# Patient Record
(demographics unavailable — no encounter records)

---

## 2024-11-13 NOTE — PHYSICAL EXAM
[General Appearance - Alert] : alert [General Appearance - In No Acute Distress] : in no acute distress [General Appearance - Well-Appearing] : well appearing [Attitude Uncooperative] : cooperative [General Appearance - Well Developed] : playful [Facies] : there were no dysmorphic facial features [Examination Of The Oral Cavity] : mucous membranes were moist and pink [Sclera] : the conjunctiva were normal [No Cough] : no cough [Auscultation Breath Sounds / Voice Sounds] : breath sounds clear to auscultation bilaterally [Stridor] : no stridor was observed [Respiration, Rhythm And Depth] : normal respiratory rhythm and effort [Normal Chest Appearance] : the chest was normal in appearance [Chest Visual Inspection Thoracic Deformity] : no chest wall deformity [Apical Impulse] : quiet precordium with normal apical impulse [Heart Rate And Rhythm] : normal heart rate and rhythm [Heart Sounds] : normal S1 and S2 [No Murmur] : no murmurs  [Heart Sounds Gallop] : no gallops [Heart Sounds Pericardial Friction Rub] : no pericardial rub [Edema] : no edema [Arterial Pulses] : normal upper and lower extremity pulses with no pulse delay [Heart Sounds Click] : no clicks [Capillary Refill Test] : normal capillary refill [Abdomen Soft] : soft [] : no hepato-splenomegaly [Nail Clubbing] : no clubbing  or cyanosis of the fingernails [Abnormal Walk] : normal gait [Skin Lesions] : no lesions [Demonstrated Behavior - Infant Nonreactive To Parents] : interactive [FreeTextEntry9] : Brace over left wrist and hand

## 2024-11-13 NOTE — CARDIOLOGY SUMMARY
[Today's Date] : [unfilled] [FreeTextEntry1] : An electrocardiogram performed on [place] on account of her symptoms reveals a normal sinus rhythm with a normal axis.  There is no evidence for chamber enlargement or hypertrophy.

## 2024-11-13 NOTE — CONSULT LETTER
[Today's Date] : [unfilled] [Name] : Name: [unfilled] [Today's Date:] : [unfilled] [] : : ~~ [Dear  ___:] : Dear Dr. [unfilled]: [Consult] : I had the pleasure of evaluating your patient, [unfilled]. My full evaluation follows. [Consult - Single Provider] : Thank you very much for allowing me to participate in the care of this patient. If you have any questions, please do not hesitate to contact me. [Sincerely,] : Sincerely, [de-identified] : Carine Magaña MD, MSE Pediatric cardiologist [facility] [FreeTextEntry4] : Dr. Lorena Nolan